# Patient Record
Sex: FEMALE | Race: WHITE | NOT HISPANIC OR LATINO
[De-identification: names, ages, dates, MRNs, and addresses within clinical notes are randomized per-mention and may not be internally consistent; named-entity substitution may affect disease eponyms.]

---

## 2021-04-01 ENCOUNTER — APPOINTMENT (OUTPATIENT)
Dept: DERMATOLOGY | Facility: CLINIC | Age: 31
End: 2021-04-01
Payer: COMMERCIAL

## 2021-04-01 DIAGNOSIS — L81.9 DISORDER OF PIGMENTATION, UNSPECIFIED: ICD-10-CM

## 2021-04-01 DIAGNOSIS — L85.8 OTHER SPECIFIED EPIDERMAL THICKENING: ICD-10-CM

## 2021-04-01 DIAGNOSIS — Z91.89 OTHER SPECIFIED PERSONAL RISK FACTORS, NOT ELSEWHERE CLASSIFIED: ICD-10-CM

## 2021-04-01 DIAGNOSIS — D22.71 MELANOCYTIC NEVI OF RIGHT LOWER LIMB, INCLUDING HIP: ICD-10-CM

## 2021-04-01 PROCEDURE — 99072 ADDL SUPL MATRL&STAF TM PHE: CPT

## 2021-04-01 PROCEDURE — 99203 OFFICE O/P NEW LOW 30 MIN: CPT

## 2021-04-01 RX ORDER — NORETHINDRONE ACETATE AND ETHINYL ESTRADIOL AND FERROUS FUMARATE 1MG-20(21)
1-20 KIT ORAL
Refills: 0 | Status: ACTIVE | COMMUNITY

## 2021-04-01 NOTE — ASSESSMENT
[FreeTextEntry1] : Mild keratosis pilaris on arms\par Postinflammatory hyperpigmentation of uncertain etiology in the left posterior shoulder\par Melanocytic nevus on right shin

## 2021-04-01 NOTE — PHYSICAL EXAM
[Alert] : alert [Oriented x 3] : ~L oriented x 3 [Well Nourished] : well nourished [FreeTextEntry3] : The following areas were examined and no significant abnormalities were seen except as noted below:\par \par Type III skin\par \par scalp, face, eyelids, nose, lips, ears, neck, chest, abdomen, back, buttocks, right arm, left arm, right hand, left hand,\par right  leg, left leg, right foot, left foot\par Breast and groin exams offered and declined by patient.\par \par Arms: Mild to moderate erythematous follicular papules, several are excoriated on the right\par Left posterior shoulder: 1.5 x 1 cm ill-defined hyperpigmented smooth patch\par Right midshin: 4 x 4 mm brown macule with clearing [place] - not suspicious on dermoscopy\par \par No suspicious lesions seen

## 2021-04-01 NOTE — HISTORY OF PRESENT ILLNESS
[FreeTextEntry1] : Evaluation of growths [de-identified] : First visit for 30-year-old Malt[place] female presents for evaluation of growths.  Particularly concerned about "dark spots" on the back. Also concerned about bumps on the arms. No history of skin cancer.

## 2022-11-04 ENCOUNTER — OUTPATIENT (OUTPATIENT)
Dept: OUTPATIENT SERVICES | Facility: HOSPITAL | Age: 32
LOS: 1 days | End: 2022-11-04
Payer: COMMERCIAL

## 2022-11-04 ENCOUNTER — INPATIENT (INPATIENT)
Facility: HOSPITAL | Age: 32
LOS: 1 days | Discharge: HOME | End: 2022-11-06
Attending: OBSTETRICS & GYNECOLOGY | Admitting: OBSTETRICS & GYNECOLOGY
Payer: COMMERCIAL

## 2022-11-04 ENCOUNTER — TRANSCRIPTION ENCOUNTER (OUTPATIENT)
Age: 32
End: 2022-11-04

## 2022-11-04 VITALS — HEART RATE: 74 BPM | OXYGEN SATURATION: 96 %

## 2022-11-04 DIAGNOSIS — O26.899 OTHER SPECIFIED PREGNANCY RELATED CONDITIONS, UNSPECIFIED TRIMESTER: ICD-10-CM

## 2022-11-04 DIAGNOSIS — Z34.80 ENCOUNTER FOR SUPERVISION OF OTHER NORMAL PREGNANCY, UNSPECIFIED TRIMESTER: ICD-10-CM

## 2022-11-04 DIAGNOSIS — Z98.890 OTHER SPECIFIED POSTPROCEDURAL STATES: Chronic | ICD-10-CM

## 2022-11-04 DIAGNOSIS — Z3A.00 WEEKS OF GESTATION OF PREGNANCY NOT SPECIFIED: ICD-10-CM

## 2022-11-04 DIAGNOSIS — Z11.52 ENCOUNTER FOR SCREENING FOR COVID-19: ICD-10-CM

## 2022-11-04 LAB
BASOPHILS # BLD AUTO: 0.01 K/UL — SIGNIFICANT CHANGE UP (ref 0–0.2)
BASOPHILS NFR BLD AUTO: 0.1 % — SIGNIFICANT CHANGE UP (ref 0–2)
EOSINOPHIL # BLD AUTO: 0.02 K/UL — SIGNIFICANT CHANGE UP (ref 0–0.5)
EOSINOPHIL NFR BLD AUTO: 0.2 % — SIGNIFICANT CHANGE UP (ref 0–6)
HCT VFR BLD CALC: 37.6 % — SIGNIFICANT CHANGE UP (ref 34.5–45)
HGB BLD-MCNC: 12.3 G/DL — SIGNIFICANT CHANGE UP (ref 11.5–15.5)
IMM GRANULOCYTES NFR BLD AUTO: 0.7 % — SIGNIFICANT CHANGE UP (ref 0–0.9)
LYMPHOCYTES # BLD AUTO: 1.5 K/UL — SIGNIFICANT CHANGE UP (ref 1–3.3)
LYMPHOCYTES # BLD AUTO: 14.4 % — SIGNIFICANT CHANGE UP (ref 13–44)
MCHC RBC-ENTMCNC: 29.1 PG — SIGNIFICANT CHANGE UP (ref 27–34)
MCHC RBC-ENTMCNC: 32.7 GM/DL — SIGNIFICANT CHANGE UP (ref 32–36)
MCV RBC AUTO: 88.9 FL — SIGNIFICANT CHANGE UP (ref 80–100)
MONOCYTES # BLD AUTO: 0.63 K/UL — SIGNIFICANT CHANGE UP (ref 0–0.9)
MONOCYTES NFR BLD AUTO: 6.1 % — SIGNIFICANT CHANGE UP (ref 2–14)
NEUTROPHILS # BLD AUTO: 8.16 K/UL — HIGH (ref 1.8–7.4)
NEUTROPHILS NFR BLD AUTO: 78.5 % — HIGH (ref 43–77)
NRBC # BLD: 0 /100 WBCS — SIGNIFICANT CHANGE UP (ref 0–0)
PLATELET # BLD AUTO: 122 K/UL — LOW (ref 150–400)
RBC # BLD: 4.23 M/UL — SIGNIFICANT CHANGE UP (ref 3.8–5.2)
RBC # FLD: 12.8 % — SIGNIFICANT CHANGE UP (ref 10.3–14.5)
SARS-COV-2 RNA SPEC QL NAA+PROBE: SIGNIFICANT CHANGE UP
SARS-COV-2 RNA SPEC QL NAA+PROBE: SIGNIFICANT CHANGE UP
WBC # BLD: 10.39 K/UL — SIGNIFICANT CHANGE UP (ref 3.8–10.5)
WBC # FLD AUTO: 10.39 K/UL — SIGNIFICANT CHANGE UP (ref 3.8–10.5)

## 2022-11-04 PROCEDURE — U0005: CPT

## 2022-11-04 PROCEDURE — U0003: CPT

## 2022-11-04 PROCEDURE — C9803: CPT

## 2022-11-04 RX ORDER — IBUPROFEN 200 MG
600 TABLET ORAL EVERY 6 HOURS
Refills: 0 | Status: COMPLETED | OUTPATIENT
Start: 2022-11-04 | End: 2023-10-03

## 2022-11-04 RX ORDER — LANOLIN
1 OINTMENT (GRAM) TOPICAL EVERY 6 HOURS
Refills: 0 | Status: DISCONTINUED | OUTPATIENT
Start: 2022-11-04 | End: 2022-11-06

## 2022-11-04 RX ORDER — TETANUS TOXOID, REDUCED DIPHTHERIA TOXOID AND ACELLULAR PERTUSSIS VACCINE, ADSORBED 5; 2.5; 8; 8; 2.5 [IU]/.5ML; [IU]/.5ML; UG/.5ML; UG/.5ML; UG/.5ML
0.5 SUSPENSION INTRAMUSCULAR ONCE
Refills: 0 | Status: DISCONTINUED | OUTPATIENT
Start: 2022-11-04 | End: 2022-11-06

## 2022-11-04 RX ORDER — SIMETHICONE 80 MG/1
80 TABLET, CHEWABLE ORAL EVERY 4 HOURS
Refills: 0 | Status: DISCONTINUED | OUTPATIENT
Start: 2022-11-04 | End: 2022-11-06

## 2022-11-04 RX ORDER — SODIUM CHLORIDE 9 MG/ML
1000 INJECTION, SOLUTION INTRAVENOUS
Refills: 0 | Status: DISCONTINUED | OUTPATIENT
Start: 2022-11-04 | End: 2022-11-05

## 2022-11-04 RX ORDER — KETOROLAC TROMETHAMINE 30 MG/ML
30 SYRINGE (ML) INJECTION ONCE
Refills: 0 | Status: DISCONTINUED | OUTPATIENT
Start: 2022-11-04 | End: 2022-11-04

## 2022-11-04 RX ORDER — AER TRAVELER 0.5 G/1
1 SOLUTION RECTAL; TOPICAL EVERY 4 HOURS
Refills: 0 | Status: DISCONTINUED | OUTPATIENT
Start: 2022-11-04 | End: 2022-11-06

## 2022-11-04 RX ORDER — BENZOCAINE 10 %
1 GEL (GRAM) MUCOUS MEMBRANE EVERY 6 HOURS
Refills: 0 | Status: DISCONTINUED | OUTPATIENT
Start: 2022-11-04 | End: 2022-11-06

## 2022-11-04 RX ORDER — HYDROCORTISONE 1 %
1 OINTMENT (GRAM) TOPICAL EVERY 6 HOURS
Refills: 0 | Status: DISCONTINUED | OUTPATIENT
Start: 2022-11-04 | End: 2022-11-06

## 2022-11-04 RX ORDER — OXYTOCIN 10 UNIT/ML
333.33 VIAL (ML) INJECTION
Qty: 20 | Refills: 0 | Status: DISCONTINUED | OUTPATIENT
Start: 2022-11-04 | End: 2022-11-06

## 2022-11-04 RX ORDER — DIBUCAINE 1 %
1 OINTMENT (GRAM) RECTAL EVERY 6 HOURS
Refills: 0 | Status: DISCONTINUED | OUTPATIENT
Start: 2022-11-04 | End: 2022-11-06

## 2022-11-04 RX ORDER — DIPHENHYDRAMINE HCL 50 MG
25 CAPSULE ORAL EVERY 6 HOURS
Refills: 0 | Status: DISCONTINUED | OUTPATIENT
Start: 2022-11-04 | End: 2022-11-06

## 2022-11-04 RX ORDER — PRAMOXINE HYDROCHLORIDE 150 MG/15G
1 AEROSOL, FOAM RECTAL EVERY 4 HOURS
Refills: 0 | Status: DISCONTINUED | OUTPATIENT
Start: 2022-11-04 | End: 2022-11-06

## 2022-11-04 RX ORDER — OXYCODONE HYDROCHLORIDE 5 MG/1
5 TABLET ORAL ONCE
Refills: 0 | Status: DISCONTINUED | OUTPATIENT
Start: 2022-11-04 | End: 2022-11-06

## 2022-11-04 RX ORDER — ACETAMINOPHEN 500 MG
975 TABLET ORAL
Refills: 0 | Status: DISCONTINUED | OUTPATIENT
Start: 2022-11-04 | End: 2022-11-06

## 2022-11-04 RX ORDER — OXYCODONE HYDROCHLORIDE 5 MG/1
5 TABLET ORAL
Refills: 0 | Status: DISCONTINUED | OUTPATIENT
Start: 2022-11-04 | End: 2022-11-06

## 2022-11-04 RX ORDER — MAGNESIUM HYDROXIDE 400 MG/1
30 TABLET, CHEWABLE ORAL
Refills: 0 | Status: DISCONTINUED | OUTPATIENT
Start: 2022-11-04 | End: 2022-11-06

## 2022-11-04 RX ORDER — CHLORHEXIDINE GLUCONATE 213 G/1000ML
1 SOLUTION TOPICAL ONCE
Refills: 0 | Status: DISCONTINUED | OUTPATIENT
Start: 2022-11-04 | End: 2022-11-05

## 2022-11-04 RX ORDER — INFLUENZA VIRUS VACCINE 15; 15; 15; 15 UG/.5ML; UG/.5ML; UG/.5ML; UG/.5ML
0.5 SUSPENSION INTRAMUSCULAR ONCE
Refills: 0 | Status: DISCONTINUED | OUTPATIENT
Start: 2022-11-04 | End: 2022-11-06

## 2022-11-04 RX ORDER — CITRIC ACID/SODIUM CITRATE 300-500 MG
15 SOLUTION, ORAL ORAL EVERY 6 HOURS
Refills: 0 | Status: DISCONTINUED | OUTPATIENT
Start: 2022-11-04 | End: 2022-11-05

## 2022-11-04 RX ORDER — SODIUM CHLORIDE 9 MG/ML
1000 INJECTION INTRAMUSCULAR; INTRAVENOUS; SUBCUTANEOUS
Refills: 0 | Status: DISCONTINUED | OUTPATIENT
Start: 2022-11-04 | End: 2022-11-06

## 2022-11-04 RX ORDER — SODIUM CHLORIDE 9 MG/ML
3 INJECTION INTRAMUSCULAR; INTRAVENOUS; SUBCUTANEOUS EVERY 8 HOURS
Refills: 0 | Status: DISCONTINUED | OUTPATIENT
Start: 2022-11-04 | End: 2022-11-06

## 2022-11-04 RX ADMIN — SODIUM CHLORIDE 125 MILLILITER(S): 9 INJECTION INTRAMUSCULAR; INTRAVENOUS; SUBCUTANEOUS at 23:56

## 2022-11-04 RX ADMIN — Medication 30 MILLIGRAM(S): at 23:41

## 2022-11-04 NOTE — OB PROVIDER DELIVERY SUMMARY - AS DELIV COMPLICATIONS OB
nuchal cord return to ED if symptoms worsen, persist or questions arise/need for outpatient follow-up

## 2022-11-04 NOTE — OB PROVIDER H&P - NSHPPHYSICALEXAM_GEN_ALL_CORE
Objective  – VS  T(C): 37.2 (11-04-22 @ 18:49)  HR: 76 (11-04-22 @ 19:56)  BP: 132/78 (11-04-22 @ 18:51)  RR: 18 (11-04-22 @ 18:49)  SpO2: 92% (11-04-22 @ 19:56)    Physical Exam  CV: RRR  Pulm: breathing comfortably on RA  Abd: gravid, nontender  Extr: moving all extremities with ease    – VE: 2/50/-3  – FHT: baseline 140, mod variability, -accels, early decels  – [place]: q2-4min  – EFW: 3770g by sono  – Sono: vertex

## 2022-11-04 NOTE — CHART NOTE - NSCHARTNOTEFT_GEN_A_CORE
Ob attending note    Pt examined for cervical change.    ICU Vital Signs Last 24 Hrs  T(C): 36.9 (04 Nov 2022 21:23), Max: 37.2 (04 Nov 2022 18:49)  T(F): 98.4 (04 Nov 2022 21:23), Max: 98.96 (04 Nov 2022 18:49)  HR: 93 (04 Nov 2022 22:05) (72 - 103)  BP: 96/56 (04 Nov 2022 22:00) (93/54 - 143/60)  BP(mean): --  ABP: --  ABP(mean): --  RR: 18 (04 Nov 2022 21:23) (18 - 18)  SpO2: 97% (04 Nov 2022 22:05) (71% - 100%)      VE: 9.5/100/0  EFM: 150bl/mod/+accels/occasional variable and early decels  [place]: q2-4min    Labor at term  -peanut ball  -efm/toco  -resuscitate prn with IVF, repositioning    MD Rosalia

## 2022-11-04 NOTE — OB PROVIDER LABOR PROGRESS NOTE - NS_SUBJECTIVE/OBJECTIVE_OBGYN_ALL_OB_FT
R1 Labor & Delivery Progress Note     Pt seen & examined at bedside for IUPC placement. FHR with intermittent variable decels.           T(C): 37.2 (11-04-22 @ 18:49), Max: 37.2 (11-04-22 @ 18:49)  HR: 89 (11-04-22 @ 20:55) (72 - 92)  BP: 115/56 (11-04-22 @ 20:55) (105/60 - 132/78)  RR: 18 (11-04-22 @ 18:49) (18 - 18)  SpO2: 100% (11-04-22 @ 20:52) (71% - 100%)
R1 Labor & Delivery Progress Note     Pt seen & examined at bedside for exam after patient received epidural      T(C): 37.2 (11-04-22 @ 18:49), Max: 37.2 (11-04-22 @ 18:49)  HR: 80 (11-04-22 @ 20:41) (72 - 92)  BP: 113/58 (11-04-22 @ 20:41) (111/56 - 132/78)  RR: 18 (11-04-22 @ 18:49) (18 - 18)  SpO2: 100% (11-04-22 @ 20:41) (71% - 100%)
impaired balance

## 2022-11-04 NOTE — OB PROVIDER LABOR PROGRESS NOTE - ASSESSMENT
Plan: 32y y/o  @ 40w4d in stable condition. IUPC placed. Amnioinfusion will be started 125cc/hr after 250cc bolus  - Con't exp mgmt of labor  - Continuous EFM, [place]  - Con't IVF    d/w attending physician Dr. Beatrice Guzman MD  PGY-1
Plan: 32y y/o  @ 40w4d in stable condition  - Con't exp mgmt  - Continuous EFM, [place]  - Con't IVF    d/w attending physician Dr. Beatrice Guzman MD  PGY-1

## 2022-11-04 NOTE — OB PROVIDER H&P - ASSESSMENT
Assessment  32y   @ 40w4d presents in early with SROM@330p     Plan  1. Admit to L+D. Routine Labs. IVF.  2. Expectant management.  3. Fetus: cat 1 tracing. VTX. EFW 3770g by sono. Continuous EFM. Sono. No concerns.  4. Prenatal issues: none  5. GBS neg  6. Pain: IV pain meds/epidural PRN    Plan per attending physician, Dr. Beatrice Guzman MD  PGY1

## 2022-11-04 NOTE — OB PROVIDER DELIVERY SUMMARY - NSSELHIDDEN_OBGYN_ALL_OB_FT
[NS_DeliveryAttending1_OBGYN_ALL_OB_FT:[id number]],[NS_DeliveryAssist1_OBGYN_ALL_OB_FT:[id number]]

## 2022-11-04 NOTE — OB PROVIDER H&P - HISTORY OF PRESENT ILLNESS
R1 Admission H&P    Subjective  HPI: 32y  @ 40w4d presents for ROL/ROR. She states she had rupture of membranes at 330p with large gush of fluid. Since then, she has had several other gushes of fluid. An hour later, at 430p, she began having contractions which slowly became more and more intense. They are currently q3-4min, and she is considering an epidural due to the pain. She denies any VB, and reports good FM.  Pt denies any other concerns.    – PNC: Denies prenatal issues. GBS neg. EFW 3770g by [place]rah.  – OBHx:   – GynHx: denies fibroids, cysts, endometriosis, abnormal pap smears, STIs  – PMH: denies  – PSH: rhinoplasty ()  – Psych: denies   – Social: denies   – Meds: PNV   – Allergies: NKDA  – Will accept blood transfusions? Yes

## 2022-11-04 NOTE — OB PROVIDER DELIVERY SUMMARY - NSPROVIDERDELIVERYNOTE_OBGYN_ALL_OB_FT
Patient was found to be fully dilated and directed to push with contractions.  Spontaneous vaginal delivery of liveborn female.  Head, shoulders and body delivered easily. Nuchal x1.  Cord was delayed 1 minute. Cord was cut.  Infant was passed to mother.  Placenta delivered spontaneously intact. Uterine massage was performed and pitocin was given.  Vaginal walls, sulci, and cervix examined and noted to be intact.  Fundus was firm. Small periurethral laceration repaired with 3-0 vicryl. Small vaginal abrasion repaired with suture x1.  Good hemostasis was noted.  Count correct x2.

## 2022-11-04 NOTE — OB RN DELIVERY SUMMARY - NSSELHIDDEN_OBGYN_ALL_OB_FT
[NS_DeliveryAttending1_OBGYN_ALL_OB_FT:[id number]],[NS_DeliveryAssist1_OBGYN_ALL_OB_FT:[id number]],[NS_DeliveryRN_OBGYN_ALL_OB_FT:[id number]=]

## 2022-11-05 LAB
COVID-19 SPIKE DOMAIN AB INTERP: POSITIVE
COVID-19 SPIKE DOMAIN ANTIBODY RESULT: >250 U/ML — HIGH
SARS-COV-2 IGG+IGM SERPL QL IA: >250 U/ML — HIGH
SARS-COV-2 IGG+IGM SERPL QL IA: POSITIVE
T PALLIDUM AB TITR SER: NEGATIVE — SIGNIFICANT CHANGE UP

## 2022-11-05 RX ORDER — IBUPROFEN 200 MG
600 TABLET ORAL EVERY 6 HOURS
Refills: 0 | Status: DISCONTINUED | OUTPATIENT
Start: 2022-11-05 | End: 2022-11-06

## 2022-11-05 RX ADMIN — Medication 975 MILLIGRAM(S): at 17:10

## 2022-11-05 RX ADMIN — Medication 600 MILLIGRAM(S): at 12:26

## 2022-11-05 RX ADMIN — Medication 975 MILLIGRAM(S): at 16:42

## 2022-11-05 RX ADMIN — Medication 600 MILLIGRAM(S): at 18:46

## 2022-11-05 RX ADMIN — Medication 975 MILLIGRAM(S): at 22:29

## 2022-11-05 RX ADMIN — Medication 975 MILLIGRAM(S): at 04:17

## 2022-11-05 RX ADMIN — Medication 600 MILLIGRAM(S): at 13:00

## 2022-11-05 RX ADMIN — Medication 975 MILLIGRAM(S): at 09:22

## 2022-11-05 RX ADMIN — Medication 1 TABLET(S): at 12:27

## 2022-11-05 RX ADMIN — Medication 975 MILLIGRAM(S): at 10:00

## 2022-11-05 RX ADMIN — Medication 975 MILLIGRAM(S): at 03:47

## 2022-11-05 RX ADMIN — Medication 975 MILLIGRAM(S): at 23:00

## 2022-11-05 NOTE — PROGRESS NOTE ADULT - ASSESSMENT
A/P: 31yo PPD#1 s/p .  Patient is stable and doing well post-partum.   - Pain well controlled, continue current pain regimen  - Increase ambulation, SCDs when not ambulating  - Continue regular diet    Nitin Guzman, PGY1

## 2022-11-05 NOTE — PROGRESS NOTE ADULT - SUBJECTIVE AND OBJECTIVE BOX
OB Progress Note:  PPD#1    S: Patient seen at bedside. Patient feels well. Pain is well controlled, tolerating regular diet, passing flatus, voiding spontaneously, ambulating without difficulty. Denies heavy vaginal bleeding, CP/SOB, N/V, lightheadedness/dizziness.     O:  Vitals:  Vital Signs Last 24 Hrs  T(C): 36.9 (2022 02:15), Max: 37.3 (2022 22:12)  T(F): 98.4 (2022 02:15), Max: 99.14 (2022 22:12)  HR: 65 (2022 02:15) (61 - 106)  BP: 104/65 (2022 02:15) (93/54 - 143/60)  BP(mean): --  RR: 18 (2022 02:15) (17 - 18)  SpO2: 96% (2022 02:15) (71% - 100%)    Parameters below as of 2022 01:20  Patient On (Oxygen Delivery Method): room air        MEDICATIONS  (STANDING):  acetaminophen     Tablet .. 975 milliGRAM(s) Oral <User Schedule>  diphtheria/tetanus/pertussis (acellular) Vaccine (ADAcel) 0.5 milliLiter(s) IntraMuscular once  ibuprofen  Tablet. 600 milliGRAM(s) Oral every 6 hours  influenza   Vaccine 0.5 milliLiter(s) IntraMuscular once  oxytocin Infusion 333.333 milliUNIT(s)/Min (1000 mL/Hr) IV Continuous <Continuous>  oxytocin Infusion 333.333 milliUNIT(s)/Min (1000 mL/Hr) IV Continuous <Continuous>  prenatal multivitamin 1 Tablet(s) Oral daily  sodium chloride 0.9% lock flush 3 milliLiter(s) IV Push every 8 hours  sodium chloride 0.9%. 1000 milliLiter(s) (125 mL/Hr) IV Continuous <Continuous>      Labs:  Blood type: O Positive  Rubella IgG: RPR:                           12.3   10.39 >-----------< 122<L>    (  @ 20:13 )             37.6    Physical Exam:  General: NAD  Abdomen: soft, non-tender, non-distended, fundus firm  Vaginal: No heavy vaginal bleeding  Extremities: No erythema/edema

## 2022-11-06 ENCOUNTER — TRANSCRIPTION ENCOUNTER (OUTPATIENT)
Age: 32
End: 2022-11-06

## 2022-11-06 VITALS
OXYGEN SATURATION: 98 % | SYSTOLIC BLOOD PRESSURE: 108 MMHG | RESPIRATION RATE: 18 BRPM | TEMPERATURE: 98 F | HEART RATE: 71 BPM | DIASTOLIC BLOOD PRESSURE: 70 MMHG

## 2022-11-06 PROCEDURE — 86780 TREPONEMA PALLIDUM: CPT

## 2022-11-06 PROCEDURE — 85025 COMPLETE CBC W/AUTO DIFF WBC: CPT

## 2022-11-06 PROCEDURE — 59025 FETAL NON-STRESS TEST: CPT

## 2022-11-06 PROCEDURE — 59050 FETAL MONITOR W/REPORT: CPT

## 2022-11-06 PROCEDURE — 86901 BLOOD TYPING SEROLOGIC RH(D): CPT

## 2022-11-06 PROCEDURE — 87635 SARS-COV-2 COVID-19 AMP PRB: CPT

## 2022-11-06 PROCEDURE — 86850 RBC ANTIBODY SCREEN: CPT

## 2022-11-06 PROCEDURE — 86900 BLOOD TYPING SEROLOGIC ABO: CPT

## 2022-11-06 PROCEDURE — G0463: CPT

## 2022-11-06 PROCEDURE — 86769 SARS-COV-2 COVID-19 ANTIBODY: CPT

## 2022-11-06 PROCEDURE — 36415 COLL VENOUS BLD VENIPUNCTURE: CPT

## 2022-11-06 RX ORDER — IBUPROFEN 200 MG
1 TABLET ORAL
Qty: 0 | Refills: 0 | DISCHARGE

## 2022-11-06 RX ORDER — ACETAMINOPHEN 500 MG
1 TABLET ORAL
Qty: 0 | Refills: 0 | DISCHARGE

## 2022-11-06 RX ADMIN — Medication 600 MILLIGRAM(S): at 09:50

## 2022-11-06 RX ADMIN — Medication 600 MILLIGRAM(S): at 08:47

## 2022-11-06 RX ADMIN — Medication 600 MILLIGRAM(S): at 02:00

## 2022-11-06 RX ADMIN — Medication 975 MILLIGRAM(S): at 06:50

## 2022-11-06 RX ADMIN — Medication 600 MILLIGRAM(S): at 01:34

## 2022-11-06 RX ADMIN — Medication 975 MILLIGRAM(S): at 06:26

## 2022-11-06 RX ADMIN — Medication 975 MILLIGRAM(S): at 12:47

## 2022-11-06 RX ADMIN — Medication 1 TABLET(S): at 08:54

## 2022-11-06 NOTE — DISCHARGE NOTE OB - CARE PROVIDER_API CALL
[place], Melissa H (MD)  Obstetrics and Gynecology  [address] [facility], [address] [place] [address]  Phone: [phone]  Fax: [phone]  Follow Up Time:

## 2022-11-06 NOTE — DISCHARGE NOTE OB - MEDICATION SUMMARY - MEDICATIONS TO TAKE
I will START or STAY ON the medications listed below when I get home from the hospital:    Tylenol 500 mg oral tablet  -- 1 cap(s) by mouth every 8 hours, As Needed  -- Indication: For pain med    Motrin 600 mg oral tablet  -- 1 tab(s) by mouth every 6 hours, As Needed  -- Indication: For pain med

## 2022-11-06 NOTE — DISCHARGE NOTE OB - PATIENT PORTAL LINK FT
You can access the FollowMyHealth Patient Portal offered by [facility] by registering at the following website: [email or web address]. By joining [email or web address]’s FollowMyHealth portal, you will also be able to view your health information using other applications (apps) compatible with our system.

## 2022-11-06 NOTE — DISCHARGE NOTE OB - MATERIALS PROVIDED
[facility] Screening Program/Breastfeeding Log/Breastfeeding Mother’s Support Group Information/Guide to Postpartum Care/[facility] Hearing Screen Program/Back To Sleep Handout/Shaken Baby Prevention Handout/Breastfeeding Guide and Packet

## 2022-11-06 NOTE — DISCHARGE NOTE OB - HOSPITAL COURSE
Pt had an uncomplicated  (please see delivery note for details). Pt had an uncomplicated postpartum course. She was discharged on PPD#2 after meeting all postpartum milestones - ambulating, tolerating PO, voiding. Pt will follow up in the office in 6 weeks for her postpartum check or earlier as needed.

## 2022-11-06 NOTE — PROGRESS NOTE ADULT - SUBJECTIVE AND OBJECTIVE BOX
OB Progress Note:  PPD#2    S: 33yo G P PPD#2 s/p . Patient feels well. Pain is well controlled. She is tolerating a regular diet and passing flatus. She is voiding spontaneously, and ambulating without difficulty. Denies CP/SOB. Denies lightheadedness/dizziness. Denies N/V.    O:  Vitals:   Vital Signs Last 24 Hrs  T(C): 36.5 (2022 06:05), Max: 36.6 (2022 17:00)  T(F): 97.7 (2022 06:05), Max: 97.9 (2022 17:00)  HR: 71 (2022 06:05) (62 - 74)  BP: 108/70 (2022 06:05) (108/70 - 114/70)  BP(mean): --  RR: 18 (2022 06:05) (18 - 18)  SpO2: 98% (2022 06:05) (96% - 98%)    Parameters below as of 2022 06:05  Patient On (Oxygen Delivery Method): room air        MEDICATIONS  (STANDING):  acetaminophen     Tablet .. 975 milliGRAM(s) Oral <User Schedule>  diphtheria/tetanus/pertussis (acellular) Vaccine (ADAcel) 0.5 milliLiter(s) IntraMuscular once  ibuprofen  Tablet. 600 milliGRAM(s) Oral every 6 hours  influenza   Vaccine 0.5 milliLiter(s) IntraMuscular once  oxytocin Infusion 333.333 milliUNIT(s)/Min (1000 mL/Hr) IV Continuous <Continuous>  oxytocin Infusion 333.333 milliUNIT(s)/Min (1000 mL/Hr) IV Continuous <Continuous>  prenatal multivitamin 1 Tablet(s) Oral daily  sodium chloride 0.9% lock flush 3 milliLiter(s) IV Push every 8 hours  sodium chloride 0.9%. 1000 milliLiter(s) (125 mL/Hr) IV Continuous <Continuous>    MEDICATIONS  (PRN):  benzocaine 20%/menthol 0.5% Spray 1 Spray(s) Topical every 6 hours PRN for Perineal discomfort  dibucaine 1% Ointment 1 Application(s) Topical every 6 hours PRN Perineal discomfort  diphenhydrAMINE 25 milliGRAM(s) Oral every 6 hours PRN Pruritus  hydrocortisone 1% Cream 1 Application(s) Topical every 6 hours PRN Moderate Pain (4-6)  lanolin Ointment 1 Application(s) Topical every 6 hours PRN nipple soreness  magnesium hydroxide Suspension 30 milliLiter(s) Oral two times a day PRN Constipation  oxyCODONE    IR 5 milliGRAM(s) Oral every 3 hours PRN Moderate to Severe Pain (4-10)  oxyCODONE    IR 5 milliGRAM(s) Oral once PRN Moderate to Severe Pain (4-10)  pramoxine 1%/zinc 5% Cream 1 Application(s) Topical every 4 hours PRN Moderate Pain (4-6)  simethicone 80 milliGRAM(s) Chew every 4 hours PRN Gas  witch hazel Pads 1 Application(s) Topical every 4 hours PRN Perineal discomfort      Labs:  Blood type: O Positive  Rubella IgG: RPR: Negative                          12.3   10.39 >-----------< 122<L>    (  @ 20:13 )             37.6      Physical Exam:  General: NAD  Abdomen: soft, non-tender, non-distended, fundus firm  Vaginal: Lochia wnl  Extremities: No erythema/edema    A/P: 33yo GP PPD#2 s/p .  Patient is stable and doing well post-partum.    - Pain well controlled, continue current pain regimen  - Increase ambulation, SCDs when not ambulating  - Continue regular diet  - Discharge planning     MD Rosalia

## 2022-11-06 NOTE — DISCHARGE NOTE OB - NS MD DC FALL RISK RISK
For information on Fall & Injury Prevention, visit: [email or web address] OR  [email or web address] OR  https://www.cdc.gov/steadi/patient.html

## 2023-04-08 NOTE — OB PROVIDER H&P - NS_PRENATALMEDS_OBGYN_A_OB
Anesthesia Evaluation     Patient summary reviewed and Nursing notes reviewed                Airway   Mallampati: I  TM distance: >3 FB  Neck ROM: full  No difficulty expected  Dental - normal exam     Pulmonary - normal exam   (+) pneumonia , pleural effusion, COPD, sleep apnea,   Cardiovascular - normal exam    ECG reviewed    (+) hypertension, past MI , CAD, CABG, dysrhythmias, CHF , hyperlipidemia,     ROS comment: 60% with hypokinesis    Neuro/Psych  (+) TIA, CVA, psychiatric history Depression,     GI/Hepatic/Renal/Endo    (+)   renal disease, diabetes mellitus,     Musculoskeletal     Abdominal  - normal exam    Bowel sounds: normal.   Substance History - negative use     OB/GYN negative ob/gyn ROS         Other   arthritis,    history of cancer                  Anesthesia Plan    ASA 4     MAC       Anesthetic plan, all risks, benefits, and alternatives have been provided, discussed and informed consent has been obtained with: patient.      
[facility]
Prenatal Vitamins

## 2023-08-29 NOTE — DISCHARGE NOTE OB - PROVIDER TOKENS
[Time Spent: ___ minutes] : I have spent [unfilled] minutes of time on the encounter. PROVIDER:[TOKEN:[[id number]]]

## 2024-07-08 PROBLEM — Z78.9 OTHER SPECIFIED HEALTH STATUS: Chronic | Status: ACTIVE | Noted: 2022-11-04

## 2024-08-03 ENCOUNTER — INPATIENT (INPATIENT)
Facility: HOSPITAL | Age: 34
LOS: 0 days | Discharge: HOME | DRG: 951 | End: 2024-08-04
Attending: OBSTETRICS & GYNECOLOGY | Admitting: OBSTETRICS & GYNECOLOGY
Payer: COMMERCIAL

## 2024-08-03 VITALS
RESPIRATION RATE: 18 BRPM | TEMPERATURE: 98 F | HEART RATE: 80 BPM | DIASTOLIC BLOOD PRESSURE: 70 MMHG | SYSTOLIC BLOOD PRESSURE: 130 MMHG

## 2024-08-03 DIAGNOSIS — Z98.890 OTHER SPECIFIED POSTPROCEDURAL STATES: Chronic | ICD-10-CM

## 2024-08-03 DIAGNOSIS — Z34.80 ENCOUNTER FOR SUPERVISION OF OTHER NORMAL PREGNANCY, UNSPECIFIED TRIMESTER: ICD-10-CM

## 2024-08-03 DIAGNOSIS — O26.899 OTHER SPECIFIED PREGNANCY RELATED CONDITIONS, UNSPECIFIED TRIMESTER: ICD-10-CM

## 2024-08-03 LAB
BASOPHILS # BLD AUTO: 0.02 K/UL — SIGNIFICANT CHANGE UP (ref 0–0.2)
BASOPHILS NFR BLD AUTO: 0.2 % — SIGNIFICANT CHANGE UP (ref 0–2)
EOSINOPHIL # BLD AUTO: 0.03 K/UL — SIGNIFICANT CHANGE UP (ref 0–0.5)
EOSINOPHIL NFR BLD AUTO: 0.2 % — SIGNIFICANT CHANGE UP (ref 0–6)
HCT VFR BLD CALC: 36.5 % — SIGNIFICANT CHANGE UP (ref 34.5–45)
HGB BLD-MCNC: 12.2 G/DL — SIGNIFICANT CHANGE UP (ref 11.5–15.5)
IMM GRANULOCYTES NFR BLD AUTO: 0.2 % — SIGNIFICANT CHANGE UP (ref 0–0.9)
LYMPHOCYTES # BLD AUTO: 1.46 K/UL — SIGNIFICANT CHANGE UP (ref 1–3.3)
LYMPHOCYTES # BLD AUTO: 12 % — LOW (ref 13–44)
MCHC RBC-ENTMCNC: 29 PG — SIGNIFICANT CHANGE UP (ref 27–34)
MCHC RBC-ENTMCNC: 33.4 GM/DL — SIGNIFICANT CHANGE UP (ref 32–36)
MCV RBC AUTO: 86.9 FL — SIGNIFICANT CHANGE UP (ref 80–100)
MONOCYTES # BLD AUTO: 0.73 K/UL — SIGNIFICANT CHANGE UP (ref 0–0.9)
MONOCYTES NFR BLD AUTO: 6 % — SIGNIFICANT CHANGE UP (ref 2–14)
NEUTROPHILS # BLD AUTO: 9.85 K/UL — HIGH (ref 1.8–7.4)
NEUTROPHILS NFR BLD AUTO: 81.4 % — HIGH (ref 43–77)
NRBC # BLD: 0 /100 WBCS — SIGNIFICANT CHANGE UP (ref 0–0)
PLATELET # BLD AUTO: 117 K/UL — LOW (ref 150–400)
RBC # BLD: 4.2 M/UL — SIGNIFICANT CHANGE UP (ref 3.8–5.2)
RBC # FLD: 13.3 % — SIGNIFICANT CHANGE UP (ref 10.3–14.5)
WBC # BLD: 12.12 K/UL — HIGH (ref 3.8–10.5)
WBC # FLD AUTO: 12.12 K/UL — HIGH (ref 3.8–10.5)

## 2024-08-03 RX ORDER — KETOROLAC TROMETHAMINE 10 MG
30 TABLET ORAL ONCE
Refills: 0 | Status: DISCONTINUED | OUTPATIENT
Start: 2024-08-03 | End: 2024-08-03

## 2024-08-03 RX ORDER — DEXTROSE MONOHYDRATE, SODIUM CHLORIDE, SODIUM LACTATE, CALCIUM CHLORIDE, MAGNESIUM CHLORIDE 1.5; 538; 448; 18.4; 5.08 G/100ML; MG/100ML; MG/100ML; MG/100ML; MG/100ML
1000 SOLUTION INTRAPERITONEAL
Refills: 0 | Status: DISCONTINUED | OUTPATIENT
Start: 2024-08-03 | End: 2024-08-03

## 2024-08-03 RX ORDER — OXYCODONE HYDROCHLORIDE 30 MG/1
5 TABLET ORAL ONCE
Refills: 0 | Status: DISCONTINUED | OUTPATIENT
Start: 2024-08-03 | End: 2024-08-04

## 2024-08-03 RX ORDER — OXYCODONE HYDROCHLORIDE 30 MG/1
5 TABLET ORAL
Refills: 0 | Status: DISCONTINUED | OUTPATIENT
Start: 2024-08-03 | End: 2024-08-04

## 2024-08-03 RX ORDER — LANOLIN 100 %
1 OINTMENT (GRAM) TOPICAL EVERY 6 HOURS
Refills: 0 | Status: DISCONTINUED | OUTPATIENT
Start: 2024-08-03 | End: 2024-08-04

## 2024-08-03 RX ORDER — CLOSTRIDIUM TETANI TOXOID ANTIGEN (FORMALDEHYDE INACTIVATED), CORYNEBACTERIUM DIPHTHERIAE TOXOID ANTIGEN (FORMALDEHYDE INACTIVATED), BORDETELLA PERTUSSIS TOXOID ANTIGEN (GLUTARALDEHYDE INACTIVATED), BORDETELLA PERTUSSIS FILAMENTOUS HEMAGGLUTININ ANTIGEN (FORMALDEHYDE INACTIVATED), BORDETELLA PERTUSSIS PERTACTIN ANTIGEN, AND BORDETELLA PERTUSSIS FIMBRIAE 2/3 ANTIGEN 5; 2; 2.5; 5; 3; 5 [LF]/.5ML; [LF]/.5ML; UG/.5ML; UG/.5ML; UG/.5ML; UG/.5ML
0.5 INJECTION, SUSPENSION INTRAMUSCULAR ONCE
Refills: 0 | Status: DISCONTINUED | OUTPATIENT
Start: 2024-08-03 | End: 2024-08-04

## 2024-08-03 RX ORDER — TRISODIUM CITRATE DIHYDRATE AND CITRIC ACID MONOHYDRATE 500; 334 MG/5ML; MG/5ML
15 SOLUTION ORAL EVERY 6 HOURS
Refills: 0 | Status: DISCONTINUED | OUTPATIENT
Start: 2024-08-03 | End: 2024-08-03

## 2024-08-03 RX ORDER — DIPHENHYDRAMINE HCL 25 MG
25 CAPSULE ORAL EVERY 6 HOURS
Refills: 0 | Status: DISCONTINUED | OUTPATIENT
Start: 2024-08-03 | End: 2024-08-04

## 2024-08-03 RX ORDER — DIBUCAINE 1 %
1 OINTMENT (GRAM) TOPICAL EVERY 6 HOURS
Refills: 0 | Status: DISCONTINUED | OUTPATIENT
Start: 2024-08-03 | End: 2024-08-04

## 2024-08-03 RX ORDER — AMPICILLIN 1 G/1
2 INJECTION, POWDER, FOR SOLUTION INTRAMUSCULAR; INTRAVENOUS ONCE
Refills: 0 | Status: COMPLETED | OUTPATIENT
Start: 2024-08-03 | End: 2024-08-03

## 2024-08-03 RX ORDER — OXYTOCIN/RINGER'S LACTATE 20/1000 ML
4 PLASTIC BAG, INJECTION (ML) INTRAVENOUS
Qty: 30 | Refills: 0 | Status: DISCONTINUED | OUTPATIENT
Start: 2024-08-03 | End: 2024-08-04

## 2024-08-03 RX ORDER — OXYTOCIN/RINGER'S LACTATE 20/1000 ML
333.33 PLASTIC BAG, INJECTION (ML) INTRAVENOUS
Qty: 20 | Refills: 0 | Status: DISCONTINUED | OUTPATIENT
Start: 2024-08-03 | End: 2024-08-04

## 2024-08-03 RX ORDER — BACTERIOSTATIC SODIUM CHLORIDE 0.9 %
3 VIAL (ML) INJECTION EVERY 8 HOURS
Refills: 0 | Status: DISCONTINUED | OUTPATIENT
Start: 2024-08-03 | End: 2024-08-04

## 2024-08-03 RX ORDER — SIMETHICONE 125 MG/1
80 TABLET, CHEWABLE ORAL EVERY 4 HOURS
Refills: 0 | Status: DISCONTINUED | OUTPATIENT
Start: 2024-08-03 | End: 2024-08-04

## 2024-08-03 RX ORDER — IBUPROFEN 200 MG
600 TABLET ORAL EVERY 6 HOURS
Refills: 0 | Status: DISCONTINUED | OUTPATIENT
Start: 2024-08-03 | End: 2024-08-04

## 2024-08-03 RX ORDER — IBUPROFEN 200 MG
600 TABLET ORAL EVERY 6 HOURS
Refills: 0 | Status: COMPLETED | OUTPATIENT
Start: 2024-08-03 | End: 2025-07-02

## 2024-08-03 RX ORDER — AMPICILLIN 1 G/1
1 INJECTION, POWDER, FOR SOLUTION INTRAMUSCULAR; INTRAVENOUS EVERY 4 HOURS
Refills: 0 | Status: DISCONTINUED | OUTPATIENT
Start: 2024-08-03 | End: 2024-08-03

## 2024-08-03 RX ORDER — CHLORHEXIDINE GLUCONATE 500 MG/1
1 CLOTH TOPICAL DAILY
Refills: 0 | Status: DISCONTINUED | OUTPATIENT
Start: 2024-08-03 | End: 2024-08-03

## 2024-08-03 RX ORDER — ACETAMINOPHEN 500 MG
975 TABLET ORAL
Refills: 0 | Status: DISCONTINUED | OUTPATIENT
Start: 2024-08-03 | End: 2024-08-04

## 2024-08-03 RX ORDER — CRANBERRY FRUIT EXTRACT 650 MG
1 CAPSULE ORAL DAILY
Refills: 0 | Status: DISCONTINUED | OUTPATIENT
Start: 2024-08-03 | End: 2024-08-04

## 2024-08-03 RX ORDER — ONDANSETRON HCL/PF 4 MG/2 ML
4 VIAL (ML) INJECTION ONCE
Refills: 0 | Status: DISCONTINUED | OUTPATIENT
Start: 2024-08-03 | End: 2024-08-04

## 2024-08-03 RX ORDER — WITCH HAZEL 500 MG/1
1 CLOTH TOPICAL EVERY 4 HOURS
Refills: 0 | Status: DISCONTINUED | OUTPATIENT
Start: 2024-08-03 | End: 2024-08-04

## 2024-08-03 RX ORDER — HYDROCORTISONE 1 %
1 CREAM (GRAM) TOPICAL EVERY 6 HOURS
Refills: 0 | Status: DISCONTINUED | OUTPATIENT
Start: 2024-08-03 | End: 2024-08-04

## 2024-08-03 RX ORDER — MAGNESIUM HYDROXIDE 400 MG/5ML
30 SUSPENSION, ORAL (FINAL DOSE FORM) ORAL
Refills: 0 | Status: DISCONTINUED | OUTPATIENT
Start: 2024-08-03 | End: 2024-08-04

## 2024-08-03 RX ORDER — OXYTOCIN/RINGER'S LACTATE 20/1000 ML
41.67 PLASTIC BAG, INJECTION (ML) INTRAVENOUS
Qty: 20 | Refills: 0 | Status: DISCONTINUED | OUTPATIENT
Start: 2024-08-03 | End: 2024-08-04

## 2024-08-03 RX ADMIN — Medication 30 MILLIGRAM(S): at 17:05

## 2024-08-03 RX ADMIN — AMPICILLIN 108 GRAM(S): 1 INJECTION, POWDER, FOR SOLUTION INTRAMUSCULAR; INTRAVENOUS at 15:02

## 2024-08-03 RX ADMIN — AMPICILLIN 200 GRAM(S): 1 INJECTION, POWDER, FOR SOLUTION INTRAMUSCULAR; INTRAVENOUS at 11:24

## 2024-08-03 RX ADMIN — Medication 4 MILLIUNIT(S)/MIN: at 13:30

## 2024-08-03 RX ADMIN — DEXTROSE MONOHYDRATE, SODIUM CHLORIDE, SODIUM LACTATE, CALCIUM CHLORIDE, MAGNESIUM CHLORIDE 125 MILLILITER(S): 1.5; 538; 448; 18.4; 5.08 SOLUTION INTRAPERITONEAL at 11:24

## 2024-08-03 NOTE — OB PROVIDER DELIVERY SUMMARY - NSPROVIDERDELIVERYNOTE_OBGYN_ALL_OB_FT
of viable male infant, shoulder and body delivered easily. Baby handed to mom and delayed cord clamping performed. Cord cut and cord gas/blood collected.   Spontaneous delivery of intact placenta, fundal massage done, firm fundus noted.   Pitocin given.   Firm fundus noted.  Perineum inspected and no laceration noted.   wants circ, consent obtained.  EBL 400cc  Bryanna Yang MD

## 2024-08-03 NOTE — OB RN DELIVERY SUMMARY - NSMECDELIVBABYA_OBGYN_ALL_OB
Celia Rose is a 31 y.o. female  1. Have you been to the ER, urgent care clinic since your last visit? Hospitalized since your last visit? Yes When: August 3, 2017 Where: [facility] Reason for visit: Allergic reaction    2. Have you seen or consulted any other health care providers outside of the [address] [place] [address] since your last visit? Include any pap smears or colon screening.  No N/A

## 2024-08-03 NOTE — OB PROVIDER H&P - NSHPLABSRESULTS_GEN_ALL_CORE
LABS:                          12.2   12.12 )-----------( 117      ( 03 Aug 2024 10:49 )             36.5

## 2024-08-03 NOTE — OB PROVIDER H&P - HISTORY OF PRESENT ILLNESS
35yo  at 39w5d presents for contractions that started overnight. States she was 2cm dilated in office. Patient denies chest pain, shortness of breath, fevers, chills, nausea, vomiting, diarrhea. LOF- VB- Cx- FM+  GBS positive EFW 3900g (7#11oz 3487g 2.5wks ago)     OBHx:  7#7oz  GYNHx: denies fibroids, cysts, abnormal paps, STDs  PMH: GERD  PSH: denies  Meds: PNVs  All: NKDA  Soc: no substance use, anxiety/depression    accepts blood

## 2024-08-03 NOTE — OB RN PATIENT PROFILE - PROVIDER NOTIFICATION
Labs today  Plan to stop the Cellcept  Continue with current topicals.     F/u in 3 months, sooner as needed      
Declines

## 2024-08-03 NOTE — OB RN PATIENT PROFILE - FALL HARM RISK - UNIVERSAL INTERVENTIONS
Bed in lowest position, wheels locked, appropriate side rails in place/Call bell, personal items and telephone in reach/Instruct patient to call for assistance before getting out of bed or chair/Non-slip footwear when patient is out of bed/[place] to call system/Physically safe environment - no spills, clutter or unnecessary equipment/Purposeful Proactive Rounding/Room/bathroom lighting operational, light cord in reach

## 2024-08-03 NOTE — OB PROVIDER H&P - NSHPREVIEWOFSYSTEMS_GEN_ALL_CORE
Patient denies chest pain, shortness of breath, fevers, chills, nausea, vomiting, diarrhea. Olumiant Pregnancy And Lactation Text: Based on animal studies, Olumiant may cause embryo-fetal harm when administered to pregnant women.  The medication should not be used in pregnancy.  Breastfeeding is not recommended during treatment.

## 2024-08-03 NOTE — OB PROVIDER LABOR PROGRESS NOTE - NS_SUBJECTIVE/OBJECTIVE_OBGYN_ALL_OB_FT
PA Note:  Patient seen and evaluated at bedside. Patient comfortable s/p epidural placement.
PA Note:  Patient seen and evaluated at bedside. Patient c/o intermittent pressure.
PA Note:  Patient seen and evaluated at bedside. Patient comfortable with epidural in place.    AROM performed without incident with clear fluid.

## 2024-08-03 NOTE — OB PROVIDER LABOR PROGRESS NOTE - NS_OBIHIFHRDETAILS_OBGYN_ALL_OB_FT
140/moderate variability/+accels/no decels
140/moderate variability/+accels/no decels
145/moderate variability/+accels/no decels

## 2024-08-03 NOTE — OB RN PATIENT PROFILE - FUNCTIONAL ASSESSMENT - DAILY ACTIVITY 2.
Intubation  Performed by: Sparkle Jamil CRNA  Authorized by: Satish Martinez MD     Intubation:     Induction:  Intravenous    Intubated:  Postinduction    Mask Ventilation:  Easy mask    Attempts:  1    Attempted By:  CRNA    Method of Intubation:  Video laryngoscopy    Blade:  Belkys 3    Laryngeal View Grade: Grade I - full view of chords      Difficult Airway Encountered?: No      Complications:  None    Airway Device:  Oral endotracheal tube    Airway Device Size:  7.0    Style/Cuff Inflation:  Cuffed (inflated to minimal occlusive pressure)    Inflation Amount (mL):  3    Tube secured:  21    Secured at:  The lips    Placement Verified By:  Capnometry    Complicating Factors:  None    Findings Post-Intubation:  BS equal bilateral and atraumatic/condition of teeth unchanged         4 = No assist / stand by assistance

## 2024-08-03 NOTE — OB RN DELIVERY SUMMARY - NS_SEPSISRSKCALC_OBGYN_ALL_OB_FT
GBS status in the 'Prenatal Lab tests/results section' on the OB RN Patient Profile must be documented.   EOS calculated successfully. EOS Risk Factor: 0.5/1000 live births ([facility] national incidence); GA=39w5d; Temp=100.2; ROM=1.3; GBS='Unknown'; Antibiotics='Broad spectrum antibiotics > 4 hrs prior to birth'

## 2024-08-03 NOTE — OB RN PATIENT PROFILE - NS_PRENATALCARE_OBGYN_ALL_OB
Subjective:       Patient ID: Genoveva Ortiz is a 44 y.o. female.    Chief Complaint: Follow-up (1week f/u after hospital stay-doing better. )    Presents for follow-up after hospitalization last week.      She presented to my office in extreme doubling over pain with nausea and vomiting.  This was ultimately found to be uterine leiomyoma.  She has had a previous endometrial ablation therefore she does not have further menses.      She was admitted and given IV pain medications.  Symptoms markedly cleared over the next 24 hours.      She was discharged home and presents today for follow-up.    No significant pain within the last week.    Follow-up      Review of Systems      Objective:      Physical Exam  Genitourinary:     Comments: On repeat examination today no bleeding was noted bimanual exam again revealed uterus upper limits normal in size irregular contour with a definite 3 cm fibroid felt along the right lateral aspect of the uterine body.  However no other adnexal masses were noted.        Assessment:       1. Pelvic pain        Plan:       Patient Instructions   We have discussed in the office the possibility of later proceeding with hysterectomy.      However it is unsure whether she will have any more significant pain with uterine fibroids.  I have discussed the probability of degeneration of the midportion of a uterine fibroid causing the acute onset of pain which is now resolved.      We have tentatively scheduled her for hysterectomy in late November early December 2023.  However if no further pain occurs we may elect to simply cancel that surgery.      
Yes

## 2024-08-03 NOTE — OB PROVIDER H&P - ASSESSMENT
33yo  at 39w5d presents in labor.    - Admit to L&D  - Routine labs, T&S  - IVF routine, T&S  - Plan: expt mgmt, amp for GBS prophylaxis    Amyeo Afroz Jereen, PGY-4  dw Dr Yang

## 2024-08-03 NOTE — OB RN PATIENT PROFILE - CAREGIVER
Nutrition Services Brief Update:    Day 17 of admit.  Gilmer Mae is a 82 y.o. male with admitting DX of ALL, Cardiac arrest.     Current Diet: Liquidized, Mildly thick with 1:1 supervision, getting Boost Glucose Control once daily with Breakfast and ready care shake with dinner meal.   Per RN, pt is slow to respond and needs a lot of encouragement with PO intake but taking bites of meals throughout the day and does well with Boost Glucose Control.  Estimated needs: 6041-2100 kcals/day (20-23 kcal/kg) and 76-92 g pro/day (1.0-1.2 g pro/day). Three Boost Glucose control/day will provide 570 kcals/day and 48 g pro/day  Wt: 76.4 kg per bedscale on 12/14. Wt fluctuates, will continue to monitor trends.   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  Surgery will need to clear the patient prior to transfer to SNF, possible DC tomorrow.  Reviewed pt's PO intake with MD and asked if TF is an option with plan of care, MD will discuss with pt but son just wants to encourage PO intake with pt for now. Discussed higher kcal supplements and that they may cause higher glucose levels, MD okay'd to trial them.   Pertinent Labs: am Glu 184. No recent FSBS to assess. Per MD notes, ALL resolved.   Pertinent Meds: Decadron, Marinol, Zofran PRN.       Problem: Nutritional:  Goal: Achieve adequate nutritional intake  Description: Patient will consume ~50% of meals  Outcome: Progressing.     Plan/Rec:   1) Changed to Boost Very High calorie with all meals, three cartons will provide 1590 kcals/day and 66 g pro/day which will meet 100% of estimated needs.   2) Monitor glucose with new supplements.   3) Continue marinol to help boost appetite.   4) RD to monitor for consistently adequate intake.      Declines Yes

## 2024-08-03 NOTE — OB RN TRIAGE NOTE - FALL HARM RISK - UNIVERSAL INTERVENTIONS
Bed in lowest position, wheels locked, appropriate side rails in place/Call bell, personal items and telephone in reach/Instruct patient to call for assistance before getting out of bed or chair/Non-slip footwear when patient is out of bed/[place] to call system/Physically safe environment - no spills, clutter or unnecessary equipment/Purposeful Proactive Rounding/Room/bathroom lighting operational, light cord in reach

## 2024-08-03 NOTE — OB RN DELIVERY SUMMARY - BABY A: ID BAND NUMBER, DELIVERY
Date/Time    PHART 7.530 08/07/2023 08:27 AM    PO2ART 51 08/07/2023 08:27 AM    GTX4XDD 27 08/07/2023 08:27 AM    SNP8CHU 22.5 08/07/2023 08:27 AM    BEART 0 08/07/2023 08:27 AM    D8PEJSHL 90 08/07/2023 08:27 AM        Type & Screen (If Applicable):  No results found for: \"LABABO\", \"LABRH\"    Drug/Infectious Status (If Applicable):  Lab Results   Component Value Date/Time    HEPCAB NONREACTIVE 09/05/2022 05:00 AM       COVID-19 Screening (If Applicable):   Lab Results   Component Value Date/Time    COVID19 Not Detected 09/15/2022 04:02 PM           Anesthesia Evaluation    Airway: Mallampati: II  TM distance: >3 FB   Neck ROM: full  Mouth opening: > = 3 FB   Dental:    (+) poor dentition      Pulmonary:   (+) wheezes: scattered current smoker                           Cardiovascular:            Rhythm: regular  Rate: normal                    Neuro/Psych:   (+) psychiatric history:            GI/Hepatic/Renal:   (+) liver disease:, renal disease:,           Endo/Other:    (+) blood dyscrasia: anemia:., .                 Abdominal:             Vascular: Other Findings:           Anesthesia Plan      MAC     ASA 3             Anesthetic plan and risks discussed with patient.         Attending anesthesiologist reviewed and agrees with Preprocedure content                SANTA Mitchell - CAMILO   9/22/2023 N/A

## 2024-08-03 NOTE — OB PROVIDER LABOR PROGRESS NOTE - ASSESSMENT
A/P:  -EFM/Ernest  -Anticipate   Dr. Yang in house  Daniela Gil PA-C
A/P:  -EFM/Amidon  -C/w pitocin  -Anticipate   Dr. Yang in house  Daniela Gil PA-C
A/P:  -EFM/Donahue  -For pitocin  -Anticipate   Dr. Yang in house  Daniela Gil PA-C

## 2024-08-04 VITALS
HEART RATE: 80 BPM | TEMPERATURE: 98 F | RESPIRATION RATE: 19 BRPM | SYSTOLIC BLOOD PRESSURE: 114 MMHG | OXYGEN SATURATION: 98 % | DIASTOLIC BLOOD PRESSURE: 73 MMHG

## 2024-08-04 LAB — T PALLIDUM AB TITR SER: NEGATIVE — SIGNIFICANT CHANGE UP

## 2024-08-04 PROCEDURE — 85025 COMPLETE CBC W/AUTO DIFF WBC: CPT

## 2024-08-04 PROCEDURE — 86901 BLOOD TYPING SEROLOGIC RH(D): CPT

## 2024-08-04 PROCEDURE — 86850 RBC ANTIBODY SCREEN: CPT

## 2024-08-04 PROCEDURE — 59050 FETAL MONITOR W/REPORT: CPT

## 2024-08-04 PROCEDURE — 86780 TREPONEMA PALLIDUM: CPT

## 2024-08-04 PROCEDURE — 36415 COLL VENOUS BLD VENIPUNCTURE: CPT

## 2024-08-04 PROCEDURE — 86900 BLOOD TYPING SEROLOGIC ABO: CPT

## 2024-08-04 RX ORDER — MEASELS, MUMPS, AND RUBELLA VACCINE, LIVE 3.4-4.2
0.5 KIT SUBCUTANEOUS ONCE
Refills: 0 | Status: DISCONTINUED | OUTPATIENT
Start: 2024-08-04 | End: 2024-08-04

## 2024-08-04 RX ORDER — ACETAMINOPHEN 500 MG
3 TABLET ORAL
Qty: 0 | Refills: 0 | DISCHARGE
Start: 2024-08-04

## 2024-08-04 RX ORDER — IBUPROFEN 200 MG
1 TABLET ORAL
Qty: 0 | Refills: 0 | DISCHARGE
Start: 2024-08-04

## 2024-08-04 RX ADMIN — Medication 600 MILLIGRAM(S): at 08:04

## 2024-08-04 RX ADMIN — Medication 975 MILLIGRAM(S): at 11:03

## 2024-08-04 RX ADMIN — Medication 600 MILLIGRAM(S): at 08:34

## 2024-08-04 RX ADMIN — Medication 600 MILLIGRAM(S): at 03:33

## 2024-08-04 RX ADMIN — Medication 600 MILLIGRAM(S): at 15:21

## 2024-08-04 RX ADMIN — Medication 975 MILLIGRAM(S): at 10:33

## 2024-08-04 RX ADMIN — Medication 600 MILLIGRAM(S): at 04:33

## 2024-08-04 RX ADMIN — Medication 600 MILLIGRAM(S): at 15:51

## 2024-08-04 NOTE — PROGRESS NOTE ADULT - PROBLEM SELECTOR PLAN 1
Increase OOB  Regular diet  PO Pain protocol  Routine Postpartum Care  Discharge planning    Mary Ann Roberts FNP-BC

## 2024-08-04 NOTE — PROGRESS NOTE ADULT - SUBJECTIVE AND OBJECTIVE BOX
Postpartum Note- PPD#1    Allergies: No Known Allergies    Blood Type O Positive  Rubella Immune  RPR : Negative    S:Patient is a  34y  PPD#1 S/P    Patient w/o complaints, pain is controlled.    Pt is OOB, tolerating PO, passing flatus. Lochia WNL.     Feeding: Breast    O:  Vital Signs Last 24 Hrs  T(C): 36.3 (04 Aug 2024 05:36), Max: 37.9 (03 Aug 2024 16:34)  T(F): 97.4 (04 Aug 2024 05:36), Max: 100.2 (03 Aug 2024 16:34)  HR: 85 (04 Aug 2024 05:36) (76 - 120)  BP: 110/70 (04 Aug 2024 05:36) (105/51 - 141/56)  BP(mean): 81 (03 Aug 2024 18:05) (81 - 89)  RR: 19 (04 Aug 2024 05:36) (16 - 19)  SpO2: 98% (04 Aug 2024 05:36) (88% - 100%)    Gen: NAD  Abdomen: Soft, nontender, non-distended, fundus firm.  Vaginal: Lochia WNL  Ext: Neg calf tenderness, neg edema    LABS:    Hemoglobin: 12.2 g/dL ( @ 10:49)      Hematocrit: 36.5 % ( @ 10:49)

## 2024-08-04 NOTE — DISCHARGE NOTE OB - MEDICATION SUMMARY - MEDICATIONS TO TAKE
I will START or STAY ON the medications listed below when I get home from the hospital:    ibuprofen 600 mg oral tablet  -- 1 tab(s) by mouth every 6 hours  -- Indication: For  (normal spontaneous vaginal delivery)    acetaminophen 325 mg oral tablet  -- 3 tab(s) by mouth every 6 hours  -- Indication: For  (normal spontaneous vaginal delivery)

## 2024-08-04 NOTE — PROGRESS NOTE ADULT - ASSESSMENT
A/P: 34y  PPD # 1 S/P  doing well    Current Issues: None    PAST MEDICAL & SURGICAL HISTORY:  No pertinent past medical history    S/P tonsillectomy    H/O rhinoplasty

## 2024-08-04 NOTE — DISCHARGE NOTE OB - CARE PROVIDER_API CALL
Bryanna Yang  Obstetrics and Gynecology  [address], [address]  [place] [address]  Phone: [phone]  Fax: [phone]  Established Patient  Follow Up Time: 1 month

## 2024-08-04 NOTE — PROGRESS NOTE ADULT - NS ATTEND AMEND GEN_ALL_CORE FT
doing well. pain ok with po pain meds.   wants to go home today  vss afebrile  ppd1 doing well  routine postpartum care  dc home today  fu office in 6 weeks  theodora schreiber md

## 2024-08-04 NOTE — DISCHARGE NOTE OB - PATIENT PORTAL LINK FT
You can access the FollowMyHealth Patient Portal offered by [facility] by registering at the following website: [email or web address]. By joining [facility]’s FollowMyHealth portal, you will also be able to view your health information using other applications (apps) compatible with our system.

## 2024-08-04 NOTE — DISCHARGE NOTE OB - CARE PLAN
1 Principal Discharge DX:	 (normal spontaneous vaginal delivery)  Assessment and plan of treatment:	self ambulation, pain control

## 2024-11-07 NOTE — OB RN PATIENT PROFILE - HAS THE PATIENT RECEIVED THE INFLUENZA VACCINE THIS SEASON?
[Fall prevention counseling provided] : Fall prevention counseling provided [Adequate lighting] : Adequate lighting [No throw rugs] : No throw rugs [Use proper foot wear] : Use proper foot wear [Sleep ___ hours/day] : Sleep [unfilled] hours/day [Engage in a relaxing activity] : Engage in a relaxing activity [Plan in advance] : Plan in advance [Potential consequences of obesity discussed] : Potential consequences of obesity discussed [Benefits of weight loss discussed] : Benefits of weight loss discussed [Structured Weight Management Program suggested:] : Structured weight management program suggested [Encouraged to maintain food diary] : Encouraged to maintain food diary [Encouraged to increase physical activity] : Encouraged to increase physical activity [Encouraged to use exercise tracking device] : Encouraged to use exercise tracking device [Weigh Self Weekly] : weigh self weekly [Decrease Portions] : decrease portions [____ min/wk Activity] : [unfilled] min/wk activity [Keep Food Diary] : keep food diary [FreeTextEntry1] : low ana  [FreeTextEntry2] : bmi 27 weight 158 [None] : None no...

## 2024-12-02 NOTE — OB RN PATIENT PROFILE - NSSDOHPUBLICBEN_OBGYN_A_OB
Patient ID: [id number]     Chief Complaint: Medicare AWV      HPI:     Leah Patricio is a 55 y.o. female here today for a Medicare Wellness. She had surgery on her left foot and it's feeling better. She has not restarted work, she finished the PT yesterday. She doesn't think she missed any doses on her lamotrigine. She was out of it 3 weeks ago and she had called about refill but nobody got back to her so she called Dr Mccoy and she called in to pharmacy here.       Opioid Screening: Patient medication list reviewed, patient is not taking prescription opioids. Patient is not using additional opioids than prescribed. Patient is at low risk of substance abuse based on this opioid use history.       Past Medical History:   Diagnosis Date    Bipolar affective disorder     Borderline mental handicap     Cerebral palsy     DJD (degenerative joint disease)     Epilepsy     Hammer toe     [place] (hard of hearing)     Hypercholesterolemia     MVP (mitral valve prolapse)     OCD (obsessive compulsive disorder)         Past Surgical History:   Procedure Laterality Date    BUNIONECTOMY      CORRECTION OF HAMMER TOE      FUSION OF METATARSOPHALANGEAL JOINT Left 08/06/2024    hallux, prox phangeal fusion 2, 3 adn 4th capsulotomy with pins       Review of patient's allergies indicates:  No Known Allergies    Outpatient Medications Marked as Taking for the 12/3/24 encounter (Office Visit) with Latasha Infante MD   Medication Sig Dispense Refill    calcium carbonate (OS-KYLE) 600 mg calcium (1,500 mg) Tab Take 600 mg by mouth once daily.      lamotrigine XR (LAMICTAL XR) 100 mg TR24 XR tablet Take 1 tablet (100 mg total) by mouth Daily. 90 tablet 3    MULTIVITAMIN ORAL Take 1 capsule by mouth once daily. NOW daily vitamin         Social History     Socioeconomic History    Marital status: Unknown   Tobacco Use    Smoking status: Never    Smokeless tobacco: Never   Substance and Sexual Activity    Alcohol use: Not Currently    Drug  use: Never    Sexual activity: Not Currently     Social Drivers of Health     Financial Resource Strain: Low Risk  (12/3/2024)    Overall Financial Resource Strain (CARDIA)     Difficulty of Paying Living Expenses: Not hard at all   Food Insecurity: No Food Insecurity (12/3/2024)    Hunger Vital Sign     Worried About Running Out of Food in the Last Year: Never true     Ran Out of Food in the Last Year: Never true   Transportation Needs: No Transportation Needs (12/3/2024)    TRANSPORTATION NEEDS     Transportation : No   Physical Activity: Inactive (12/3/2024)    Exercise Vital Sign     Days of Exercise per Week: 0 days     Minutes of Exercise per Session: 0 min   Stress: No Stress Concern Present (12/3/2024)    [employer] - Occupational Stress Questionnaire     Feeling of Stress : Not at all   Housing Stability: Low Risk  (12/3/2024)    Housing Stability Vital Sign     Unable to Pay for Housing in the Last Year: No     Homeless in the Last Year: No        Family History   Problem Relation Name Age of Onset    Hypertension Mother      Asthma Father      COPD Father      Asthma Sister      Asthma Brother      Depression Maternal Grandmother      Bladder Cancer Maternal Grandfather      Kidney cancer Maternal Grandfather      Depression Paternal Grandmother      Cancer Paternal Aunt          Gynecological        Patient Care Team:  Latasha Infante MD as PCP - General (Internal Medicine)  Radha Mccoy MD as Consulting Physician (Neurology)  Virgen De León MD as Consulting Physician (Cardiology)  Kaur Kovacs MD (Obstetrics and Gynecology)  Ana Hodgson LPN as Licensed Practical Nurse       Subjective:     Review of Systems   Constitutional: Negative.    HENT: Negative.     Eyes: Negative.    Respiratory: Negative.     Cardiovascular: Negative.    Gastrointestinal: Negative.    Genitourinary: Negative.    Musculoskeletal: Negative.         She only used the pain meds one or  two days for her foot surgery, she doesn't usually feel a lot of pain.    Skin:         Her foot healed   Neurological: Negative.  Negative for tingling and seizures.   Endo/Heme/Allergies: Negative.    Psychiatric/Behavioral: Negative.           Patient Reported Health Risk Assessment       Objective:     /68   Pulse 100   Temp 98.1 °F (36.7 °C) (Oral)   Resp 15   Ht 5' (1.524 m)   Wt 52.6 kg (116 lb)   LMP  (LMP Unknown)   SpO2 98%   BMI 22.65 kg/m²     Physical Exam  Vitals reviewed.   HENT:      Head: Normocephalic and atraumatic.      Right Ear: Tympanic membrane, ear canal and external ear normal.      Left Ear: Tympanic membrane, ear canal and external ear normal.      Mouth/Throat:      Mouth: Mucous membranes are moist.      Pharynx: No oropharyngeal exudate or posterior oropharyngeal erythema.   Eyes:      General: No scleral icterus.     Extraocular Movements: Extraocular movements intact.      Conjunctiva/sclera: Conjunctivae normal.      Pupils: Pupils are equal, round, and reactive to light.   Cardiovascular:      Rate and Rhythm: Regular rhythm. Tachycardia present.      Comments: Rate popped up to 105 when she was examined but had gone down to 90 before exam was complete.   Pulmonary:      Effort: Pulmonary effort is normal.      Breath sounds: Normal breath sounds.   Abdominal:      General: Abdomen is flat.      Palpations: Abdomen is soft.      Tenderness: There is no abdominal tenderness. There is no guarding.   Musculoskeletal:         General: No tenderness.      Cervical back: Normal range of motion. No tenderness.   Skin:     General: Skin is warm and dry.      Comments: incision   Neurological:      Mental Status: She is alert.   Psychiatric:         Mood and Affect: Mood normal.           A comprehensive HEALTH RISK ASSESSMENT was completed today. Results are summarized below:    There are NO EMOTIONAL/SOCIAL CONCERNS identified on today's screening for Social Isolation,  Depression and Anxiety.    There are NO COGNITIVE FUNCTION CONCERNS identified on today's screening.  The following FUNCTIONAL AND/OR SAFETY CONCERNS were identified on today's screening for Physical Symptoms, Nutritional, Home Safety/Living Situation, Fall Risk, Activities of Daily Living, Independent Activities of Daily Living, Physical Activity,Timed Up and Go test and Whisper test::  *Patient reports recent HEARING/VISION DIFFICULTIES. (Have you noticed any hearing or vision difficulties?: (!) Yes)   *Patient reports NO PREVENTIVE HOME HAZARD EVALUATION OR MODIFICATION. (Have you or someone else evaluated or modified your home with additional safety features like handrails on all stairs, installed grab bars in the bathroom, secured loose rugs and ensured good lighting in all areas?: (!) No)  *Patient's WHISPER TEST was ABNORMAL. (Was the patient's Whisper test normal in both ears?: (!) No)    The patient reports NO OPIOID PRESCRIPTIONS. This was confirmed through medication reconciliation and the [facility] website.    The patient is NOT A TOBACCO USER.  The patient reports NO SIGNIFICANT ALCOHOL USE.     All Questions regarding food, transportation or housing were not answered today.    Lab Visit on 11/11/2024   Component Date Value    Sodium 11/11/2024 141     Potassium 11/11/2024 4.5     Chloride 11/11/2024 107     CO2 11/11/2024 28     Glucose 11/11/2024 93     Blood Urea Nitrogen 11/11/2024 13.8     Creatinine 11/11/2024 0.79     Calcium 11/11/2024 10.0     Protein Total 11/11/2024 6.9     Albumin 11/11/2024 4.1     Globulin 11/11/2024 2.8     Albumin/Globulin Ratio 11/11/2024 1.5     Bilirubin Total 11/11/2024 0.3     ALP 11/11/2024 109     ALT 11/11/2024 17     AST 11/11/2024 18     eGFR 11/11/2024 >60     Anion Gap 11/11/2024 6.0     BUN/Creatinine Ratio 11/11/2024 17     Cholesterol Total 11/11/2024 218 (H)     HDL Cholesterol 11/11/2024 64 (H)     Triglyceride 11/11/2024 82     Cholesterol/HDL Ratio  11/11/2024 3     Very Low Density Lipopro* 11/11/2024 16     LDL Cholesterol 11/11/2024 138.00     TSH 11/11/2024 1.758     Lamotrigine, S 11/11/2024 2.5 (L)     Color, UA 11/11/2024 Straw     Appearance, UA 11/11/2024 Clear     Specific Gravity, UA 11/11/2024 1.010     pH, UA 11/11/2024 7.5     Protein, UA 11/11/2024 Negative     Glucose, UA 11/11/2024 Negative     Ketones, UA 11/11/2024 Negative     Blood, UA 11/11/2024 Negative     Bilirubin, UA 11/11/2024 Negative     Urobilinogen, UA 11/11/2024 0.2     Nitrites, UA 11/11/2024 Negative     Leukocyte Esterase, UA 11/11/2024 Negative     WBC 11/11/2024 3.61 (L)     RBC 11/11/2024 4.72     Hgb 11/11/2024 13.8     Hct 11/11/2024 41.5     MCV 11/11/2024 87.9     MCH 11/11/2024 29.2     MCHC 11/11/2024 33.3     RDW 11/11/2024 12.4     Platelet 11/11/2024 269     MPV 11/11/2024 9.4     Neut % 11/11/2024 46.0     Lymph % 11/11/2024 45.2     Mono % 11/11/2024 6.6     Eos % 11/11/2024 1.4     Basophil % 11/11/2024 0.8     Lymph # 11/11/2024 1.63     Neut # 11/11/2024 1.66 (L)     Mono # 11/11/2024 0.24     Eos # 11/11/2024 0.05     Baso # 11/11/2024 0.03     IG# 11/11/2024 0.00     IG% 11/11/2024 0.0     Bacteria, UA 11/11/2024 None Seen     RBC, UA 11/11/2024 None Seen     WBC, UA 11/11/2024 None Seen     Squamous Epithelial Cell* 11/11/2024 Rare        Assessment/Plan:     1. Medicare annual wellness visit, subsequent  Comments:  Does not want vaccines or cancer screening. Her mother and she are not worried about it    2. Mixed obsessional thoughts and acts  Comments:  Stable    3. Cerebral palsy, unspecified type  Comments:  Stable    4. Sensorineural hearing loss (SNHL) of both ears    5. Dyslipidemia, goal LDL below 160  Comments:  LDL holding below goal refuses statin    6. Refusal of statin medication by patient    7. Osteoarthritis of left ankle or foot  Comments:  Did well with surgery    8. Mitral valve prolapse    9. Neutropenia, unspecified  type  Comments:  Has dropped with last lab, will recheck.  Orders:  -     CBC Auto Differential           Medicare Annual Wellness and Personalized Prevention Plan:   Fall Risk + Home Safety + Hearing Impairment + Depression Screen + Opioid and Substance Abuse Screening + Cognitive Impairment Screen + Health Risk Assessment all reviewed.     Health Maintenance Topics with due status: Not Due       Topic Last Completion Date    Lipid Panel 11/11/2024    RSV Vaccine (Age 60+ and Pregnant patients) Not Due      The patient's Health Maintenance was reviewed and the following appears to be due at this time:   Health Maintenance Due   Topic Date Due    TETANUS VACCINE  Never done       Advance Care Planning   I attest to discussing Advance Care Planning with patient and/or family member.  Education was provided including the importance of the Health Care Power of , Advance Directives, and/or [place] documentation.  The patient expressed understanding to the importance of this information and discussion.   Advance Care Planning     Date: 12/03/2024  Patient did not wish or was not able to name a surrogate decision maker or provide an Advance Care Plan.           Follow up in about 1 year (around 12/4/2025) for Wellness. In addition to their scheduled follow up, the patient has also been instructed to follow up on as needed basis.        I do not need help with these